# Patient Record
Sex: FEMALE | Race: WHITE | NOT HISPANIC OR LATINO | Employment: FULL TIME | URBAN - METROPOLITAN AREA
[De-identification: names, ages, dates, MRNs, and addresses within clinical notes are randomized per-mention and may not be internally consistent; named-entity substitution may affect disease eponyms.]

---

## 2020-01-16 ENCOUNTER — TELEPHONE (OUTPATIENT)
Dept: MRI IMAGING | Facility: HOSPITAL | Age: 50
End: 2020-01-16

## 2020-01-16 NOTE — TELEPHONE ENCOUNTER
I spoke with the pt in May 2019, she had me send off for her prior Breast images from [place] and she was to get a MMG and 6 months later a Breast MRI. Pt has not had her MMG as of 1/16/2020.

## 2021-03-12 ENCOUNTER — TRANSCRIBE ORDERS (OUTPATIENT)
Dept: ADMINISTRATIVE | Facility: HOSPITAL | Age: 51
End: 2021-03-12

## 2021-03-12 DIAGNOSIS — N95.0 ABNORMAL VAGINAL BLEEDING IN POSTMENOPAUSAL PATIENT: Primary | ICD-10-CM

## 2021-04-05 ENCOUNTER — HOSPITAL ENCOUNTER (OUTPATIENT)
Dept: ULTRASOUND IMAGING | Facility: HOSPITAL | Age: 51
Discharge: HOME | End: 2021-04-05
Admitting: FAMILY MEDICINE

## 2021-04-05 DIAGNOSIS — N95.0 ABNORMAL VAGINAL BLEEDING IN POSTMENOPAUSAL PATIENT: ICD-10-CM

## 2021-04-05 PROCEDURE — 76830 TRANSVAGINAL US NON-OB: CPT

## 2021-04-13 DIAGNOSIS — D25.9 UTERINE LEIOMYOMA, UNSPECIFIED LOCATION: Primary | ICD-10-CM

## 2021-04-27 ENCOUNTER — OFFICE VISIT (OUTPATIENT)
Dept: OBSTETRICS AND GYNECOLOGY | Facility: CLINIC | Age: 51
End: 2021-04-27

## 2021-04-27 VITALS
WEIGHT: 141.4 LBS | HEIGHT: 62 IN | DIASTOLIC BLOOD PRESSURE: 74 MMHG | BODY MASS INDEX: 26.02 KG/M2 | SYSTOLIC BLOOD PRESSURE: 116 MMHG

## 2021-04-27 DIAGNOSIS — Z80.3 FAMILY HISTORY OF BREAST CANCER: ICD-10-CM

## 2021-04-27 DIAGNOSIS — N95.1 PERIMENOPAUSE: ICD-10-CM

## 2021-04-27 DIAGNOSIS — R14.0 BLOATING: Primary | ICD-10-CM

## 2021-04-27 PROBLEM — E28.2 PCOS (POLYCYSTIC OVARIAN SYNDROME): Status: RESOLVED | Noted: 2021-04-27 | Resolved: 2021-04-27

## 2021-04-27 PROBLEM — D25.1 INTRAMURAL LEIOMYOMA OF UTERUS: Status: ACTIVE | Noted: 2021-04-27

## 2021-04-27 PROBLEM — N92.6 IRREGULAR MENSES: Status: ACTIVE | Noted: 2021-04-27

## 2021-04-27 PROBLEM — N92.6 IRREGULAR MENSES: Status: RESOLVED | Noted: 2021-04-27 | Resolved: 2021-04-27

## 2021-04-27 PROBLEM — E28.2 PCOS (POLYCYSTIC OVARIAN SYNDROME): Status: ACTIVE | Noted: 2021-04-27

## 2021-04-27 PROCEDURE — 99204 OFFICE O/P NEW MOD 45 MIN: CPT | Performed by: OBSTETRICS & GYNECOLOGY

## 2021-04-27 RX ORDER — DICYCLOMINE HYDROCHLORIDE 10 MG/1
20 CAPSULE ORAL 3 TIMES DAILY PRN
Qty: 30 CAPSULE | Refills: 3 | Status: SHIPPED | OUTPATIENT
Start: 2021-04-27 | End: 2021-04-27

## 2021-04-27 RX ORDER — TERBINAFINE HYDROCHLORIDE 250 MG/1
TABLET ORAL
COMMUNITY
Start: 2021-04-12 | End: 2022-06-14

## 2021-04-27 RX ORDER — DICYCLOMINE HYDROCHLORIDE 10 MG/1
20 CAPSULE ORAL 3 TIMES DAILY PRN
Qty: 30 CAPSULE | Refills: 3 | Status: SHIPPED | OUTPATIENT
Start: 2021-04-27 | End: 2021-05-27

## 2021-04-27 RX ORDER — PAROXETINE 7.5 MG/1
CAPSULE ORAL
COMMUNITY
Start: 2021-04-26 | End: 2022-06-14

## 2021-04-27 RX ORDER — FLUCONAZOLE 100 MG/1
150 TABLET ORAL
Qty: 3 TABLET | Refills: 2 | Status: SHIPPED | OUTPATIENT
Start: 2021-04-27 | End: 2021-04-27

## 2021-04-27 NOTE — PROGRESS NOTES
"Chief Complaint   Patient presents with   • Establish Care   • Fibroids       Subjective   HPI  Dennise Chang is a 51 y.o. female, , who presents for established care, fibroids is initial.  She is perimenopausal experiencing hot flashes, inability to lose weight, as well as irregular menstrual bleeding.     She states she has experienced fibroid problem for years and they had been stable.  Patient states she feels \"full\" in her pelvic area.  She describes the severity as mild - moderate.  She states that the problem is worsening.  The patient reports additional symptoms as bloating.      Her recent US was reviewed and she has multiple small fibroids with normal sized uterus and no adnexal masses. These findings discussed and all questions answered.     Her previously drawn labs were reviewed today.     She has declined hormonal treatment due to family history of breast cancer. We discussed options of genetic testing and she agrees.     Her last LMP was 3/2021.  Periods are irregular, patient had not had a menses for almost one year until last month, she also complains of hot flases.  Dysmenorrhea:mild, occurring randomly.  Patient reports problems with: none.  Partner Status: Marital Status: .  New Partners since last visit: no.  Desires STD Screening: no.    Additional OB/GYN History   Current contraception: contraceptive methods: None  Desires to: do not start contraception  Last Pap : 2019 - negative per patient ( at [facility] )  Last Completed Pap Smear       Status Date      PAP SMEAR No completions recorded        History of abnormal Pap smear: no  Last mammogram: >5 years ago - negative per patient. Patient states she has upcoming appt 2021.  Last Completed Mammogram    Patient has no health maintenance due at this time       Tobacco Usage?: No   OB History        2    Para   1    Term   1            AB   1    Living   1       SAB        TAB        Ectopic        Molar        Multiple " "       Live Births                    Health Maintenance   Topic Date Due   • Annual Gynecologic Pelvic and Breast Exam  Never done   • COLONOSCOPY  Never done   • ANNUAL PHYSICAL  Never done   • COVID-19 Vaccine (2 - Pfizer 2-dose series) 04/07/2021   • HEPATITIS C SCREENING  Never done   • PAP SMEAR  Never done   • INFLUENZA VACCINE  08/01/2021   • TDAP/TD VACCINES (2 - Td) 12/27/2021   • Pneumococcal Vaccine 0-64  Aged Out       The additional following portions of the patient's history were reviewed and updated as appropriate: allergies, current medications, past family history, past medical history, past social history, past surgical history and problem list.    Review of Systems   Constitutional: Positive for unexpected weight gain. Negative for activity change, appetite change and unexpected weight loss.   Eyes: Negative for visual disturbance.   Respiratory: Negative for cough and shortness of breath.    Cardiovascular: Negative for chest pain and palpitations.   Gastrointestinal: Positive for abdominal distention. Negative for abdominal pain, nausea and vomiting.   Genitourinary: Positive for menstrual problem and pelvic pain. Negative for breast discharge, breast lump, breast pain and urinary incontinence.   Musculoskeletal: Negative for back pain.   Neurological: Negative for light-headedness and headache.   Psychiatric/Behavioral: Negative for agitation, behavioral problems, decreased concentration and depressed mood.       I have reviewed and agree with the HPI, ROS, and historical information as entered above. Bairon Renee MD    Objective   /74   Ht 157.5 cm (62\")   Wt 64.1 kg (141 lb 6.4 oz)   Breastfeeding No   BMI 25.86 kg/m²     Physical Exam  Vitals reviewed. Exam conducted with a chaperone present.   Constitutional:       Appearance: Normal appearance. She is normal weight.   HENT:      Head: Normocephalic and atraumatic.   Cardiovascular:      Rate and Rhythm: Normal rate and " regular rhythm.   Pulmonary:      Effort: Pulmonary effort is normal.      Breath sounds: Normal breath sounds.   Abdominal:      General: Abdomen is flat. Bowel sounds are normal.      Palpations: Abdomen is soft.   Genitourinary:     General: Normal vulva.      Vagina: Normal.      Cervix: Normal.      Uterus: Normal.       Adnexa: Right adnexa normal and left adnexa normal.      Rectum: Normal.   Skin:     General: Skin is warm and dry.   Neurological:      Mental Status: She is alert and oriented to person, place, and time.   Psychiatric:         Mood and Affect: Mood normal.         Behavior: Behavior normal.         Assessment/Plan     Assessment     Problem List Items Addressed This Visit     None      Visit Diagnoses     Bloating    -  Primary    Perimenopause        Relevant Orders    Follicle Stimulating Hormone    Estradiol    Vitamin D 25 Hydroxy    Progesterone    US Non-ob Transvaginal    Family history of breast cancer        Relevant Orders    Ambulatory Referral to Genetic Counseling/Testing - [facility]          Plan   1.  Will monitor uterine fibroids.  Since her only complaints are related to perimenopause and bloating, I do not feel as though hysterectomy would resolve her abdominal bloating. We discussed diet as well as bentyl as needed.   2.  Will repeat imaging in 6 weeks.   3.  Will proceed with genetic testing due to family history of breast cancer. If negative would consider hormonal treatment for perimenopause. Labs drawn today.   4.  Instructions to return to clinic sooner than 6 weeks if she experiences acute onset of pain or heavy menstrual bleeding.     Bairon Renee MD  04/27/2021

## 2021-04-27 NOTE — PATIENT INSTRUCTIONS
Perimenopause    Perimenopause is the normal time of life before and after menstrual periods stop completely (menopause). Perimenopause can begin 2-8 years before menopause, and it usually lasts for 1 year after menopause. During perimenopause, the ovaries may or may not produce an egg.  What are the causes?  This condition is caused by a natural change in hormone levels that happens as you get older.  What increases the risk?  This condition is more likely to start at an earlier age if you have certain medical conditions or treatments, including:  · A tumor of the pituitary gland in the brain.  · A disease that affects the ovaries and hormone production.  · Radiation treatment for cancer.  · Certain cancer treatments, such as chemotherapy or hormone (anti-estrogen) therapy.  · Heavy smoking and excessive alcohol use.  · Family history of early menopause.  What are the signs or symptoms?  Perimenopausal changes affect each woman differently. Symptoms of this condition may include:  · Hot flashes.  · Night sweats.  · Irregular menstrual periods.  · Decreased sex drive.  · Vaginal dryness.  · Headaches.  · Mood swings.  · Depression.  · Memory problems or trouble concentrating.  · Irritability.  · Tiredness.  · Weight gain.  · Anxiety.  · Trouble getting pregnant.  How is this diagnosed?  This condition is diagnosed based on your medical history, a physical exam, your age, your menstrual history, and your symptoms. Hormone tests may also be done.  How is this treated?  In some cases, no treatment is needed. You and your health care provider should make a decision together about whether treatment is necessary. Treatment will be based on your individual condition and preferences. Various treatments are available, such as:  · Menopausal hormone therapy (MHT).  · Medicines to treat specific symptoms.  · Acupuncture.  · Vitamin or herbal supplements.  Before starting treatment, make sure to let your health care  provider know if you have a personal or family history of:  · Heart disease.  · Breast cancer.  · Blood clots.  · Diabetes.  · Osteoporosis.  Follow these instructions at home:  Lifestyle  · Do not use any products that contain nicotine or tobacco, such as cigarettes and e-cigarettes. If you need help quitting, ask your health care provider.  · Eat a balanced diet that includes fresh fruits and vegetables, whole grains, soybeans, eggs, lean meat, and low-fat dairy.  · Get at least 30 minutes of physical activity on 5 or more days each week.  · Avoid alcoholic and caffeinated beverages, as well as spicy foods. This may help prevent hot flashes.  · Get 7-8 hours of sleep each night.  · Dress in layers that can be removed to help you manage hot flashes.  · Find ways to manage stress, such as deep breathing, meditation, or journaling.  General instructions  · Keep track of your menstrual periods, including:  ? When they occur.  ? How heavy they are and how long they last.  ? How much time passes between periods.  · Keep track of your symptoms, noting when they start, how often you have them, and how long they last.  · Take over-the-counter and prescription medicines only as told by your health care provider.  · Take vitamin supplements only as told by your health care provider. These may include calcium, vitamin E, and vitamin D.  · Use vaginal lubricants or moisturizers to help with vaginal dryness and improve comfort during sex.  · Talk with your health care provider before starting any herbal supplements.  · Keep all follow-up visits as told by your health care provider. This is important. This includes any group therapy or counseling.  Contact a health care provider if:  · You have heavy vaginal bleeding or pass blood clots.  · Your period lasts more than 2 days longer than normal.  · Your periods are recurring sooner than 21 days.  · You bleed after having sex.  Get help right away if:  · You have chest pain,  trouble breathing, or trouble talking.  · You have severe depression.  · You have pain when you urinate.  · You have severe headaches.  · You have vision problems.  Summary  · Perimenopause is the time when a woman's body begins to move into menopause. This may happen naturally or as a result of other health problems or medical treatments.  · Perimenopause can begin 2-8 years before menopause, and it usually lasts for 1 year after menopause.  · Perimenopausal symptoms can be managed through medicines, lifestyle changes, and complementary therapies such as acupuncture.  This information is not intended to replace advice given to you by your health care provider. Make sure you discuss any questions you have with your health care provider.  Document Revised: 11/30/2018 Document Reviewed: 01/23/2018  Elsevier Patient Education © 2021 [employer]

## 2021-04-28 LAB
25(OH)D3+25(OH)D2 SERPL-MCNC: 31.7 NG/ML (ref 30–100)
ESTRADIOL SERPL-MCNC: <5 PG/ML
FSH SERPL-ACNC: 108 MIU/ML
PROGEST SERPL-MCNC: 0.1 NG/ML

## 2021-05-25 ENCOUNTER — TELEPHONE (OUTPATIENT)
Dept: RADIATION ONCOLOGY | Facility: HOSPITAL | Age: 51
End: 2021-05-25

## 2021-06-08 ENCOUNTER — OFFICE VISIT (OUTPATIENT)
Dept: OBSTETRICS AND GYNECOLOGY | Facility: CLINIC | Age: 51
End: 2021-06-08

## 2021-06-08 VITALS
HEIGHT: 62 IN | BODY MASS INDEX: 25.58 KG/M2 | DIASTOLIC BLOOD PRESSURE: 80 MMHG | SYSTOLIC BLOOD PRESSURE: 110 MMHG | WEIGHT: 139 LBS

## 2021-06-08 DIAGNOSIS — D25.1 INTRAMURAL LEIOMYOMA OF UTERUS: Primary | ICD-10-CM

## 2021-06-08 PROCEDURE — 99213 OFFICE O/P EST LOW 20 MIN: CPT | Performed by: OBSTETRICS & GYNECOLOGY

## 2021-06-08 NOTE — PATIENT INSTRUCTIONS
Uterine Fibroids    Uterine fibroids are lumps of tissue (tumors) in your womb (uterus). They are not cancer (are benign).  Most women with this condition do not need treatment. Sometimes fibroids can affect your ability to have children (your fertility). If that happens, you may need surgery to take out the fibroids.  Follow these instructions at home:  · Take over-the-counter and prescription medicines only as told by your doctor. Your doctor may suggest NSAIDs (such as aspirin or ibuprofen) to help with pain.  · Ask your doctor if you should:  ? Take iron pills.  ? Eat more foods that have iron in them, such as dark green, leafy vegetables.  · If directed, apply heat to your back or belly to reduce pain. Use the heat source that your doctor recommends, such as a moist heat pack or a heating pad.  ? Put a towel between your skin and the heat source.  ? Leave the heat on for 20-30 minutes.  ? Remove the heat if your skin turns bright red. This is especially important if you are unable to feel pain, heat, or cold. You may have a greater risk of getting burned.  · Pay close attention to your period (menstrual) cycles. Tell your doctor about any changes, such as:  ? A heavier blood flow than usual.  ? Needing to use more pads or tampons than normal.  ? A change in how many days your period lasts.  ? A change in symptoms that come with your period, such as cramps or back pain.  · Keep all follow-up visits as told by your doctor. This is important. Your doctor may need to watch your fibroids over time for any changes.  Contact a doctor if you:  · Have pain that does not get better with medicine or heat, such as pain or cramps in:  ? Your back.  ? The area between your hip bones (pelvic area).  ? Your belly.  · Have new bleeding between your periods.  · Have more bleeding during or between your periods.  · Feel very tired or weak.  · Feel light-headed.  Get help right away if you:  · Pass out (faint).  · Have pain in the  area between your hip bones that suddenly gets worse.  · Have bleeding that soaks a tampon or pad in 30 minutes or less.  Summary  · Uterine fibroids are lumps of tissue (tumors) in your womb (uterus). They are not cancer.  · The only treatment that most women need is taking aspirin or ibuprofen for pain.  · Contact a doctor if you have pain or cramps that do not get better with medicine.  · Make sure you know what symptoms you should get help for right away.  This information is not intended to replace advice given to you by your health care provider. Make sure you discuss any questions you have with your health care provider.  Document Revised: 11/30/2018 Document Reviewed: 11/13/2018  [employer] Patient Education © 2021 [employer]

## 2021-06-08 NOTE — PROGRESS NOTES
Chief Complaint   Patient presents with   • Follow-up     fibroids patient had ultrasound today to follow up       Subjective   HPI  Dennise Chang is a 51 y.o. female, , who presents for follow up for fibroids    She states she has experienced this problem for 11 years.  She describes the severity as moderate.  She states that the problem is resolving .  The patient reports additional symptoms as irritability.      US performed today and reviewed images and compared with prior studies. No increase in size. These findings discussed and all questions answered.     Her last LMP was No LMP recorded (exact date)..  Periods are irregular, lasting 2 days.  Dysmenorrhea:moderate, occurring throughout menses.  Patient reports problems with: none.  Partner Status: Marital Status: .  New Partners since last visit: no.  Desires STD Screening: no.    Additional OB/GYN History   Current contraception: contraceptive methods: None  Desires to: do not start contraception  Last Pap : patient not sure   Last Completed Pap Smear     This patient has no relevant Health Maintenance data.        History of abnormal Pap smear: no  Last mammogram: scheduled soon per patient   Last Completed Mammogram     This patient has no relevant Health Maintenance data.        Tobacco Usage?: No   OB History        2    Para   1    Term   1            AB   1    Living   1       SAB        TAB        Ectopic        Molar        Multiple        Live Births                    Health Maintenance   Topic Date Due   • Annual Gynecologic Pelvic and Breast Exam  Never done   • COLORECTAL CANCER SCREENING  Never done   • ANNUAL PHYSICAL  Never done   • COVID-19 Vaccine (2 - Pfizer 2-dose series) 2021   • HEPATITIS C SCREENING  Never done   • PAP SMEAR  Never done   • INFLUENZA VACCINE  2021   • TDAP/TD VACCINES (2 - Td or Tdap) 2021   • Pneumococcal Vaccine 0-64  Aged Out       The additional following portions of  "the patient's history were reviewed and updated as appropriate: allergies, current medications, past family history, past medical history, past social history, past surgical history and problem list.    Review of Systems   Constitutional: Negative.  Negative for activity change, appetite change, unexpected weight gain and unexpected weight loss.   HENT: Negative.    Eyes: Negative.  Negative for visual disturbance.   Respiratory: Negative.  Negative for cough and shortness of breath.    Cardiovascular: Negative.  Negative for chest pain and palpitations.   Gastrointestinal: Negative.  Negative for abdominal distention, abdominal pain, nausea and vomiting.   Endocrine: Negative.    Genitourinary: Negative.  Negative for menstrual problem and pelvic pain.   Musculoskeletal: Negative.    Allergic/Immunologic: Negative.    Neurological: Negative.  Negative for light-headedness.   Hematological: Negative.    Psychiatric/Behavioral: Negative.  Negative for agitation, behavioral problems, decreased concentration, dysphoric mood and depressed mood.       I have reviewed and agree with the HPI, ROS, and historical information as entered above. Bairon Renee MD    Objective   /80   Ht 157.5 cm (62\")   Wt 63 kg (139 lb)   LMP  (Exact Date)   BMI 25.42 kg/m²     Physical Exam  Vitals reviewed.   Constitutional:       Appearance: Normal appearance. She is normal weight.   Abdominal:      General: Abdomen is flat. Bowel sounds are normal.      Palpations: Abdomen is soft.   Skin:     General: Skin is warm and dry.   Neurological:      Mental Status: She is alert and oriented to person, place, and time.   Psychiatric:         Mood and Affect: Mood normal.         Behavior: Behavior normal.         Assessment/Plan     Assessment     Problem List Items Addressed This Visit        Genitourinary and Reproductive     Intramural leiomyoma of uterus - Primary    Relevant Orders    US Non-ob Transvaginal            Plan "     Return in about 1 year (around 6/8/2022) for Annual physical and repeat US.  2.  Fibroids stable and as long as she is asymptomatic, no treatment required.       Bairon Renee MD  06/08/2021

## 2021-06-18 ENCOUNTER — HOSPITAL ENCOUNTER (OUTPATIENT)
Dept: MRI IMAGING | Facility: HOSPITAL | Age: 51
Discharge: HOME | End: 2021-06-18

## 2021-06-18 ENCOUNTER — HOSPITAL ENCOUNTER (OUTPATIENT)
Dept: MAMMOGRAPHY | Facility: HOSPITAL | Age: 51
Discharge: HOME | End: 2021-06-18

## 2021-06-18 ENCOUNTER — APPOINTMENT (OUTPATIENT)
Dept: OTHER | Facility: HOSPITAL | Age: 51
End: 2021-06-18

## 2021-06-18 DIAGNOSIS — Z80.3 FAMILY HISTORY OF MALIGNANT NEOPLASM OF BREAST: ICD-10-CM

## 2021-06-18 DIAGNOSIS — Z12.39 BREAST CANCER SCREENING, HIGH RISK PATIENT: ICD-10-CM

## 2021-06-18 PROCEDURE — A9577 INJ MULTIHANCE: HCPCS | Performed by: FAMILY MEDICINE

## 2021-06-18 PROCEDURE — 77063 BREAST TOMOSYNTHESIS BI: CPT | Performed by: RADIOLOGY

## 2021-06-18 PROCEDURE — 0 GADOBENATE DIMEGLUMINE 529 MG/ML SOLUTION: Performed by: FAMILY MEDICINE

## 2021-06-18 PROCEDURE — C8937 CAD BREAST MRI: HCPCS

## 2021-06-18 PROCEDURE — 77049 MRI BREAST C-+ W/CAD BI: CPT | Performed by: RADIOLOGY

## 2021-06-18 PROCEDURE — 77049 MRI BREAST C-+ W/CAD BI: CPT

## 2021-06-18 PROCEDURE — 77063 BREAST TOMOSYNTHESIS BI: CPT

## 2021-06-18 PROCEDURE — 77067 SCR MAMMO BI INCL CAD: CPT | Performed by: RADIOLOGY

## 2021-06-18 PROCEDURE — 77067 SCR MAMMO BI INCL CAD: CPT

## 2021-06-18 RX ADMIN — GADOBENATE DIMEGLUMINE 10 ML: 529 INJECTION, SOLUTION INTRAVENOUS at 09:43

## 2021-08-24 ENCOUNTER — CLINICAL SUPPORT (OUTPATIENT)
Dept: GENETICS | Facility: HOSPITAL | Age: 51
End: 2021-08-24

## 2021-08-24 DIAGNOSIS — Z80.3 FAMILY HISTORY OF BREAST CANCER: Primary | ICD-10-CM

## 2021-08-24 DIAGNOSIS — Z13.79 GENETIC SCREENING: ICD-10-CM

## 2021-08-24 PROCEDURE — 96040: CPT | Performed by: GENETIC COUNSELOR, MS

## 2021-08-24 NOTE — PROGRESS NOTES
Dennise Chang, a 51-year-old female, was seen for genetic counseling due to a family history of breast cancer.  Ms. Chang was 12 years old at menarche and 35 when she had her first child.  She is perimenopausal and retains her uterus and ovaries.  She has not taken hormone replacement therapy in the past. She has annual mammograms and has also had screening breast MRIs and reports no breast biopsies. She was interested in discussing genetic testing recommendations as well as her risks for breast cancer and management guidelines.      PERTINENT FAMILY HISTORY: (See attached pedigree)   Mother: Breast cancer (ER+), 40    Negative 45 gene panel in 2019  Sister:  Breast cancer (ER+), 45 (no genetic testing completed)  Father:  Prostate cancer, 72    We do not currently have records regarding any of these diagnoses.      RISK ASSESSMENT:  Ms. Chang’s family history of breast cancer raises the question of a hereditary cancer syndrome.  She meets NCCN guidelines criteria for BRCA1/2 testing based on having a close relative with a breast cancer diagnosed under age 50. In cases where an affected relative, such as Ms. Chang’s sister, is not interested in pursuing testing, it is appropriate to offer testing to an unaffected individual.  We also discussed the presence of other cancer susceptibility genes and the ability to test for these in addition to BRCA1/2 through a multigene panel.     If Ms. Chang has genetic testing and it is negative, or she opts not to pursue testing, her management should be guided by a family history-based risk assessment.  We discussed Ms. Chang’s breast cancer risk based on family history risk assessments. Computer modeling estimates that Ms. Chang’s lifetime personal risk for developing breast cancer is up to 27.9% (VIOLET/Tyrer-Cuzick model), which is increased above the average 49-year-old woman’s risk of 9.6%. In general, a lifetime risk above 20% is considered to be “high risk” where  increased screening is warranted.  Ms. Chang was interested in the potential impact that hormone replacement therapy would have on her lifetime breast cancer risk. When including 5 years of HRT starting at age 49, the Tyrer-Cuzick model estimates that the lifetime breast cancer risk would be 32.8%.   This risk assessment is based on the family history information provided at the time of the appointment. The assessment could change in the future should new information be obtained.    GENETIC COUNSELING:  We reviewed the family history information in detail.  Cancer is very common; approximately 1 in 3 individuals will develop cancer within a lifetime.  It is not uncommon to see multiple individuals within a family with cancer given the high chance for a person to develop cancer.  The interaction of many factors determines each person’s personal risk, including age, family or personal history of cancer, and external factors such as diet and environmental exposures.  Most often, we believe cancer to be a multifactorial disease caused by an accumulation of genetic alterations acquired over our lifetime, with environmental factors acting in the development of a malignancy.    Cancer cases follow three general patterns: sporadic, familial, and hereditary.  While most breast cancer (75-80%) is sporadic, some cases appear to occur in family clusters.  These cases are said to be familial and account for 10-20% of breast cancer cases.  Familial cases may be due to a combination of shared genes and environmental factors among family members.  In even fewer cases (5-10%), the risk for cancer is inherited, and the genes responsible for the increased cancer risk are known.   Family histories typical of hereditary cancer syndromes usually include multiple first- and second-degree relatives diagnosed with cancer types that define a syndrome.  These cases tend to be diagnosed at younger- than-expected ages and can be bilateral or  multifocal.  The cancer in these families follows an autosomal dominant inheritance pattern, which indicates the likely presence of a mutation in a cancer susceptibility gene.  Children and siblings of an individual believed to carry this mutation have a 50% chance of inheriting that mutation, thereby inheriting the increased risk to develop cancer.    Hereditary breast cancer accounts for 5-10% of all cases of breast cancer.  A significant proportion of hereditary breast and ovarian cancer can be attributed to mutations in the BRCA1 and BRCA2 genes.  The cancer in these families follows an autosomal dominant pattern of inheritance.  Mutations in these genes confer an increased risk for breast cancer, ovarian cancer, male breast cancer, prostate cancer, and pancreatic cancer. Women with a BRCA1 or BRCA2 mutation have up to an 87% lifetime risk of breast cancer and up to a 44% risk of ovarian cancer. We discussed management guidelines for men and women with a BRCA mutation.  There are other genes in addition to BRCA1/2 that are associated with hereditary cancer risk for breast cancer and other cancers, and the standard approach to genetic testing is via a multigene panel.  Genes included on these panels have varying degrees of risk associated, and management guidelines vary based on the specific gene.  We discussed potential impact on management, including increased screening and surgical risk reduction options depending on results.      GENETIC TESTING:  The risks, benefits and limitations of genetic testing and implications for clinical management following testing were reviewed.  DNA test results can influence decisions regarding screening and prevention.  Genetic testing can have significant psychological implications for both individuals and families. Also discussed was the possibility of employment and insurance discrimination based on genetic test results and the law in place to prevent this ([place]), as well of  the limitations of this law (lack of protection from life insurance discrimination).      The implications of a positive or negative test result were discussed.  The limited value of negative test results was emphasized, particularly given the significant population risk for breast cancer and the existence of other cancer susceptibility genes.  The importance of initiating testing on an affected family member was emphasized.  In general, a negative genetic test is most informative if a mutation has first been established in an affected member of the family. In cases where an affected individual is unavailable or unwilling to pursue testing, testing an unaffected individual can be considered.  We also discussed the possibility of identifying a variant of uncertain significance (VUS), which is a difference in a gene that may or may not impact cancer risks.  The majority of VUS’s are ultimately reclassified as benign variation.  Ms. Chang is going to consider the information we discussed today before making a decision regarding pursuing testing.     CLINICAL MANAGEMENT GUIDELINES: Ms. Chang’s lifetime risk for breast cancer is estimated to be above 20% based on her family history. Given Ms. Chang’s increased risk, options available to individuals with a high lifetime risk for breast cancer were discussed, including increased surveillance and chemoprevention.    Increased surveillance, based on NCCN guidelines, would consist of semi-annual clinical breast exams and monthly self-breast exams starting by age 18 and annual mammography starting 10 years younger than the earliest diagnosis in the family, or by age 40, whichever is earliest. According to an American Cancer Society expert panel and NCCN guidelines, annual breast MRI should be offered to women whose lifetime risk of breast cancer is 20-25 percent or more, also starting 10 years before the earliest diagnosis in the family, or by age 40. Breast cancer  chemoprevention is another option that can be considered for women with an elevated risk of breast cancer. Studies have shown that chemoprevention, such as Tamoxifen and Raloxifene, can cut the risk of estrogen receptor positive breast cancer by up to 50% when taken by high-risk women over a 5-year period. There are risks and side effects associated with these medications; therefore, the risks versus benefits must be considered prior to deciding to take chemopreventative medications.   Recommendations could change if Ms. Chang were to have a positive genetic test result.       We also discussed recommendations for Ms. Chang’s daughter due to her paternal family history.  Ms. Chang’s  could pursue genetic testing based on his family history of breast and ovarian cancer to clarify risk of a genetic mutation for their daughter.  If he chooses not to pursue testing, Ms. Chang’s daughter could opt to pursue testing in the future.  If genetic testing was negative for both Ms. Chang and her , testing would not be indicated for their daughter, but she could be offered a risk assessment at some point in her 20s to guide screening recommendations.      PLAN:  Ms. Chang plans to take time to consider the information discussed today before making a decision regarding testing.  Testing is available to her at any time and she can contact our office at  for coordination of sample collection.  At this time, she is considered to have an elevated lifetime risk for breast cancer based on family history, and increased screening is indicated.  Increased screening is currently being coordinated through her primary care.  She is welcome to contact us in the meantime if she has any questions, concerns, or decides to proceed with testing.    Sally Newell MS, [facility], [facility]  Licensed Certified Genetic Counselor       Cc: Dennise Renee MD

## 2021-09-01 ENCOUNTER — APPOINTMENT (OUTPATIENT)
Dept: PREADMISSION TESTING | Facility: HOSPITAL | Age: 51
End: 2021-09-01

## 2022-06-14 ENCOUNTER — OFFICE VISIT (OUTPATIENT)
Dept: OBSTETRICS AND GYNECOLOGY | Facility: CLINIC | Age: 52
End: 2022-06-14

## 2022-06-14 VITALS
BODY MASS INDEX: 26.28 KG/M2 | HEIGHT: 62 IN | SYSTOLIC BLOOD PRESSURE: 114 MMHG | DIASTOLIC BLOOD PRESSURE: 72 MMHG | WEIGHT: 142.8 LBS

## 2022-06-14 DIAGNOSIS — Z01.419 ENCOUNTER FOR ANNUAL ROUTINE GYNECOLOGICAL EXAMINATION: Primary | ICD-10-CM

## 2022-06-14 PROBLEM — Z80.3 FAMILY HISTORY OF BREAST CANCER: Status: ACTIVE | Noted: 2022-06-14

## 2022-06-14 PROCEDURE — 99396 PREV VISIT EST AGE 40-64: CPT | Performed by: OBSTETRICS & GYNECOLOGY

## 2022-06-14 NOTE — PROGRESS NOTES
GYN Annual Exam     CC - Here for annual exam.     Subjective   HPI  Dennise Chang is a 52 y.o. female, , who presents for annual well woman exam.  She is menopausal.  Patient reports problems with: hot flashes and pain with intercourse. She is also having urinary urgency. We discussed PT for pelvic floor therapy and she is interested.  Partner Status: Marital Status: .  New Partners since last visit: no.      Additional OB/GYN History   Current contraception: contraceptive methods: None  Desires to: do not start contraception  Last Pap : 2019, negative per pt. (performed at [facility])   Last Completed Pap Smear     This patient has no relevant Health Maintenance data.        History of abnormal Pap smear: no  Family history of uterine, colon, breast, or ovarian cancer: yes - mother and sister had breast cancer  Performs monthly Self-Breast Exam: yes  Last mammogram: 2021, negative per pt. (performed at [facility])   Last Completed Mammogram          Ordered - MAMMOGRAM (Every 2 Years) Ordered on 2021  Mammo Screening Digital Tomosynthesis Bilateral With CAD              Last colonoscopy: never  Last Completed Colonoscopy     This patient has no relevant Health Maintenance data.        Last DEXA: never   Exercises Regularly: yes  Feelings of Anxiety or Depression: no    Tobacco Usage?: No   OB History        2    Para   1    Term   1            AB   1    Living   1       SAB        IAB        Ectopic        Molar        Multiple        Live Births                    Health Maintenance   Topic Date Due   • Annual Gynecologic Pelvic and Breast Exam  Never done   • COLORECTAL CANCER SCREENING  Never done   • ANNUAL PHYSICAL  Never done   • HEPATITIS C SCREENING  Never done   • PAP SMEAR  Never done   • TDAP/TD VACCINES (2 - Td or Tdap) 2021   • ZOSTER VACCINE (1 of 2) Never done   • COVID-19 Vaccine (4 - Booster for Pfizer series) 2022   • INFLUENZA VACCINE   "10/01/2022   • MAMMOGRAM  06/18/2023   • Pneumococcal Vaccine 0-64  Aged Out       The additional following portions of the patient's history were reviewed and updated as appropriate: allergies, current medications, past family history, past medical history, past social history, past surgical history and problem list.    Review of Systems   Constitutional: Negative.    HENT: Negative.    Respiratory: Negative.    Cardiovascular: Negative.    Gastrointestinal: Negative.    Genitourinary: Positive for amenorrhea, pelvic pressure and urgency. Negative for breast discharge, breast lump, breast pain and pelvic pain.   Musculoskeletal: Negative.    Neurological: Negative.    Hematological: Negative.    Psychiatric/Behavioral: Negative.        I have reviewed and agree with the HPI, ROS, and historical information as entered above. Bairon Renee MD    Objective   /72   Ht 157.5 cm (62.01\")   Wt 64.8 kg (142 lb 12.8 oz)   LMP  (LMP Unknown)   Breastfeeding No   BMI 26.11 kg/m²     Physical Exam  Vitals reviewed. Exam conducted with a chaperone present.   Constitutional:       Appearance: Normal appearance. She is normal weight.   HENT:      Head: Normocephalic and atraumatic.   Cardiovascular:      Rate and Rhythm: Normal rate and regular rhythm.   Pulmonary:      Effort: Pulmonary effort is normal.      Breath sounds: Normal breath sounds.   Chest:   Breasts:      Right: Normal.      Left: Normal.       Abdominal:      General: Abdomen is flat. Bowel sounds are normal.      Palpations: Abdomen is soft.   Genitourinary:     General: Normal vulva.      Vagina: Normal.      Cervix: Normal.      Uterus: Normal.       Adnexa: Right adnexa normal and left adnexa normal.   Musculoskeletal:      Cervical back: Neck supple.   Skin:     General: Skin is warm and dry.   Neurological:      Mental Status: She is alert and oriented to person, place, and time.   Psychiatric:         Mood and Affect: Mood normal.        "  Behavior: Behavior normal.         Assessment & Plan     Assessment     Problem List Items Addressed This Visit    None     Visit Diagnoses     Encounter for annual routine gynecological examination    -  Primary    Relevant Orders    LIQUID-BASED PAP SMEAR, [facility] ([facility])    Ambulatory Referral For Screening Colonoscopy    Mammo Screening Digital Tomosynthesis Bilateral With CAD    Ambulatory Referral to Physical Therapy Pelvic Floor          1. GYN annual well woman exam.   2. Family history of breast cancer  3. Urinary urgency    Plan     1. Annual gynecologic exam performed today including breast and pelvic exam. Pap smear performed today as well as age appropriate labs. Mammogram scheduled and recommend having genetic testing performed due to family history. Encouraged colonoscopy and referral placed  2. Will refer to PT due to pelvic floor dysfunction.     Bairon Renee MD  06/14/2022

## 2022-06-15 LAB — REF LAB TEST METHOD: NORMAL

## 2022-07-13 ENCOUNTER — HOSPITAL ENCOUNTER (OUTPATIENT)
Dept: MAMMOGRAPHY | Facility: HOSPITAL | Age: 52
Discharge: HOME | End: 2022-07-13
Admitting: OBSTETRICS & GYNECOLOGY

## 2022-07-13 DIAGNOSIS — Z01.419 ENCOUNTER FOR ANNUAL ROUTINE GYNECOLOGICAL EXAMINATION: ICD-10-CM

## 2022-07-13 PROCEDURE — 77063 BREAST TOMOSYNTHESIS BI: CPT

## 2022-07-13 PROCEDURE — 77067 SCR MAMMO BI INCL CAD: CPT | Performed by: RADIOLOGY

## 2022-07-13 PROCEDURE — 77067 SCR MAMMO BI INCL CAD: CPT

## 2022-07-13 PROCEDURE — 77063 BREAST TOMOSYNTHESIS BI: CPT | Performed by: RADIOLOGY

## 2023-08-09 ENCOUNTER — TRANSCRIBE ORDERS (OUTPATIENT)
Dept: ADMINISTRATIVE | Facility: HOSPITAL | Age: 53
End: 2023-08-09
Payer: COMMERCIAL

## 2023-08-09 DIAGNOSIS — E78.5 HYPERLIPIDEMIA, UNSPECIFIED HYPERLIPIDEMIA TYPE: ICD-10-CM

## 2023-08-09 DIAGNOSIS — Z82.49 FAMILY HISTORY OF EARLY CAD: Primary | ICD-10-CM

## 2023-09-05 ENCOUNTER — HOSPITAL ENCOUNTER (OUTPATIENT)
Dept: CT IMAGING | Facility: HOSPITAL | Age: 53
Discharge: HOME | End: 2023-09-05
Admitting: FAMILY MEDICINE

## 2023-09-05 DIAGNOSIS — E78.5 HYPERLIPIDEMIA, UNSPECIFIED HYPERLIPIDEMIA TYPE: ICD-10-CM

## 2023-09-05 DIAGNOSIS — Z82.49 FAMILY HISTORY OF EARLY CAD: ICD-10-CM

## 2023-09-05 PROCEDURE — 75571 CT HRT W/O DYE W/CA TEST: CPT

## 2023-10-16 ENCOUNTER — TRANSCRIBE ORDERS (OUTPATIENT)
Dept: GENERAL RADIOLOGY | Facility: HOSPITAL | Age: 53
End: 2023-10-16
Payer: COMMERCIAL

## 2023-10-16 DIAGNOSIS — M54.50 LUMBAR PAIN: Primary | ICD-10-CM

## 2024-08-16 ENCOUNTER — TELEPHONE (OUTPATIENT)
Dept: GENETICS | Facility: HOSPITAL | Age: 54
End: 2024-08-16
Payer: COMMERCIAL

## 2024-08-16 NOTE — TELEPHONE ENCOUNTER
Called pt regarding fax in genetic referral. Left message on pt's vm. Will switch to pt to schedule and stand by.